# Patient Record
Sex: FEMALE | Race: WHITE | NOT HISPANIC OR LATINO | Employment: FULL TIME | URBAN - METROPOLITAN AREA
[De-identification: names, ages, dates, MRNs, and addresses within clinical notes are randomized per-mention and may not be internally consistent; named-entity substitution may affect disease eponyms.]

---

## 2018-06-19 ENCOUNTER — OFFICE VISIT (OUTPATIENT)
Dept: DERMATOLOGY | Facility: CLINIC | Age: 48
End: 2018-06-19
Payer: COMMERCIAL

## 2018-06-19 DIAGNOSIS — L81.4 LENTIGO: ICD-10-CM

## 2018-06-19 DIAGNOSIS — L82.1 SEBORRHEIC KERATOSIS: ICD-10-CM

## 2018-06-19 DIAGNOSIS — H01.119 EYELID DERMATITIS, ALLERGIC/CONTACT: Primary | ICD-10-CM

## 2018-06-19 DIAGNOSIS — D18.00 ANGIOMA: ICD-10-CM

## 2018-06-19 DIAGNOSIS — L72.0 MILIA: ICD-10-CM

## 2018-06-19 DIAGNOSIS — K13.0 ANGULAR CHEILITIS: ICD-10-CM

## 2018-06-19 DIAGNOSIS — D22.9 MULTIPLE BENIGN NEVI: ICD-10-CM

## 2018-06-19 PROCEDURE — 10040 EXTRACTION: CPT | Mod: S$GLB,,, | Performed by: DERMATOLOGY

## 2018-06-19 PROCEDURE — 99203 OFFICE O/P NEW LOW 30 MIN: CPT | Mod: 25,S$GLB,, | Performed by: DERMATOLOGY

## 2018-06-19 RX ORDER — KETOCONAZOLE 20 MG/G
CREAM TOPICAL
Qty: 15 G | Refills: 1 | Status: SHIPPED | OUTPATIENT
Start: 2018-06-19

## 2018-06-19 RX ORDER — FLUTICASONE PROPIONATE 0.05 MG/G
OINTMENT TOPICAL
Qty: 15 G | Refills: 1 | Status: SHIPPED | OUTPATIENT
Start: 2018-06-19

## 2018-06-19 RX ORDER — NORELGESTROMIN AND ETHINYL ESTRADIOL 150; 35 UG/D; UG/D
PATCH TRANSDERMAL
COMMUNITY
Start: 2018-06-15

## 2018-06-19 NOTE — PROGRESS NOTES
Subjective:       Patient ID:  Janeth Ayala is a 47 y.o. female who presents for   Chief Complaint   Patient presents with    Rash     eyes, dry, 6 weeks, Prev TX OTC hydrocotersone/aloe/neosporin     Skin Check     TBSE     Pt is here today for TBSE with a c/o of rash to her eyes. Pt states that rash has been present for about 6 weeks. Her eyes are very inflamed and itchy. Prev TX of hydrocortisone, aloe and neosporin. Hydrocortisone helps bring down inflammation.       Rash  - Initial  Affected locations: left eye and right eye  Duration: 6 weeks  Signs / symptoms: redness and inflamed  Severity: mild  Timing: constant  Exacerbated by: allergies.  Relieving factors/Treatments tried: OTC hydrocortisone  Improvement on treatment: mild        Review of Systems   Eyes: Positive for eyelid inflammation. Negative for itching, eye watering and visual change.   Skin: Positive for itching, rash, daily sunscreen use, activity-related sunscreen use and wears hat. Negative for recent sunburn.   Hematologic/Lymphatic: Does not bruise/bleed easily.        Objective:    Physical Exam   Constitutional: She appears well-developed and well-nourished. No distress.   Neurological: She is alert and oriented to person, place, and time. She is not disoriented.   Psychiatric: She has a normal mood and affect.   Skin:   Areas Examined (abnormalities noted in diagram):   Scalp / Hair Palpated and Inspected  Head / Face Inspection Performed  Neck Inspection Performed  Chest / Axilla Inspection Performed  Abdomen Inspection Performed  Genitals / Buttocks / Groin Inspection Performed  Back Inspection Performed  RUE Inspected  LUE Inspection Performed  RLE Inspected  LLE Inspection Performed  Nails and Digits Inspection Performed                   Diagram Legend     Erythematous scaling macule/papule c/w actinic keratosis       Vascular papule c/w angioma      Pigmented verrucoid papule/plaque c/w seborrheic keratosis      Yellow  umbilicated papule c/w sebaceous hyperplasia      Irregularly shaped tan macule c/w lentigo     1-2 mm smooth white papules consistent with Milia      Movable subcutaneous cyst with punctum c/w epidermal inclusion cyst      Subcutaneous movable cyst c/w pilar cyst      Firm pink to brown papule c/w dermatofibroma      Pedunculated fleshy papule(s) c/w skin tag(s)      Evenly pigmented macule c/w junctional nevus     Mildly variegated pigmented, slightly irregular-bordered macule c/w mildly atypical nevus      Flesh colored to evenly pigmented papule c/w intradermal nevus       Pink pearly papule/plaque c/w basal cell carcinoma      Erythematous hyperkeratotic cursted plaque c/w SCC      Surgical scar with no sign of skin cancer recurrence      Open and closed comedones      Inflammatory papules and pustules      Verrucoid papule consistent consistent with wart     Erythematous eczematous patches and plaques     Dystrophic onycholytic nail with subungual debris c/w onychomycosis     Umbilicated papule    Erythematous-base heme-crusted tan verrucoid plaque consistent with inflamed seborrheic keratosis     Erythematous Silvery Scaling Plaque c/w Psoriasis     See annotation      Assessment / Plan:        Eyelid dermatitis, allergic/contact  -     fluticasone (CUTIVATE) 0.005 % ointment; Apply to affected areas of face BID prn irritation.  Dispense: 15 g; Refill: 1  -  D/C neosporin as this is most likely allergen.    Angular cheilitis  -     ketoconazole (NIZORAL) 2 % cream; Apply to corners of mouth BID prn irritation.  Dispense: 15 g; Refill: 1  -  Can mix with fluticasone ointment.  -  Use Vaseline at night. D/C neosporin.    Seborrheic keratosis  These are benign, inherited growths without a malignant potential. Reassurance given to patient. No treatment is necessary. Handout was provided.    Angioma  This is a benign vascular lesion. Reassurance given. No treatment required.    Lentigo  These are benign sun spots  which should be monitored for changes. Patient instructed in importance of daily broad-spectrum sunscreen use with SPF of at least 30. Sun avoidance and topical protection/protective clothing discussed.    Multiple benign nevi  Multiple benign-appearing nevi present on exam today. Reassurance provided. Counseled patient to periodically examine moles and return to clinic if any moles change or become symptomatic.    Milia  This is a small, benign cyst. Reassurance provided. Patient requests removal as she continues to get more.    Acne surgery procedure note:  The affected areas were cleaned with alcohol. Lesions were incised with #11 blade or with 20 gauge needle. Contents of the milia and/or comedones were expressed with a comedo extractor x 4 lesions. No complications.     Follow-up in about 4 weeks (around 7/17/2018), or if symptoms worsen or fail to improve.